# Patient Record
Sex: MALE | Race: WHITE | NOT HISPANIC OR LATINO | Employment: OTHER | URBAN - METROPOLITAN AREA
[De-identification: names, ages, dates, MRNs, and addresses within clinical notes are randomized per-mention and may not be internally consistent; named-entity substitution may affect disease eponyms.]

---

## 2018-01-19 ENCOUNTER — APPOINTMENT (OUTPATIENT)
Dept: PREADMISSION TESTING | Facility: HOSPITAL | Age: 78
End: 2018-01-19

## 2018-01-19 VITALS
DIASTOLIC BLOOD PRESSURE: 93 MMHG | HEART RATE: 94 BPM | RESPIRATION RATE: 20 BRPM | SYSTOLIC BLOOD PRESSURE: 177 MMHG | OXYGEN SATURATION: 97 % | BODY MASS INDEX: 27.58 KG/M2 | HEIGHT: 68 IN | WEIGHT: 182 LBS | TEMPERATURE: 98 F

## 2018-01-19 LAB
ANION GAP SERPL CALCULATED.3IONS-SCNC: 14.5 MMOL/L
BACTERIA UR QL AUTO: ABNORMAL /HPF
BILIRUB UR QL STRIP: NEGATIVE
BUN BLD-MCNC: 12 MG/DL (ref 8–23)
BUN/CREAT SERPL: 9.7 (ref 7–25)
CALCIUM SPEC-SCNC: 10 MG/DL (ref 8.6–10.5)
CHLORIDE SERPL-SCNC: 97 MMOL/L (ref 98–107)
CLARITY UR: ABNORMAL
CO2 SERPL-SCNC: 28.5 MMOL/L (ref 22–29)
COLOR UR: YELLOW
CREAT BLD-MCNC: 1.24 MG/DL (ref 0.76–1.27)
DEPRECATED RDW RBC AUTO: 43.4 FL (ref 37–54)
ERYTHROCYTE [DISTWIDTH] IN BLOOD BY AUTOMATED COUNT: 12.7 % (ref 11.5–14.5)
GFR SERPL CREATININE-BSD FRML MDRD: 57 ML/MIN/1.73
GLUCOSE BLD-MCNC: 103 MG/DL (ref 65–99)
GLUCOSE UR STRIP-MCNC: NEGATIVE MG/DL
HCT VFR BLD AUTO: 48.3 % (ref 40.4–52.2)
HGB BLD-MCNC: 16.2 G/DL (ref 13.7–17.6)
HGB UR QL STRIP.AUTO: ABNORMAL
HYALINE CASTS UR QL AUTO: ABNORMAL /LPF
KETONES UR QL STRIP: NEGATIVE
LEUKOCYTE ESTERASE UR QL STRIP.AUTO: ABNORMAL
MCH RBC QN AUTO: 31.6 PG (ref 27–32.7)
MCHC RBC AUTO-ENTMCNC: 33.5 G/DL (ref 32.6–36.4)
MCV RBC AUTO: 94.2 FL (ref 79.8–96.2)
NITRITE UR QL STRIP: NEGATIVE
PH UR STRIP.AUTO: 7 [PH] (ref 5–8)
PLATELET # BLD AUTO: 220 10*3/MM3 (ref 140–500)
PMV BLD AUTO: 11.5 FL (ref 6–12)
POTASSIUM BLD-SCNC: 4.6 MMOL/L (ref 3.5–5.2)
PROT UR QL STRIP: NEGATIVE
RBC # BLD AUTO: 5.13 10*6/MM3 (ref 4.6–6)
RBC # UR: ABNORMAL /HPF
REF LAB TEST METHOD: ABNORMAL
SODIUM BLD-SCNC: 140 MMOL/L (ref 136–145)
SP GR UR STRIP: 1.01 (ref 1–1.03)
SQUAMOUS #/AREA URNS HPF: ABNORMAL /HPF
UROBILINOGEN UR QL STRIP: ABNORMAL
WBC NRBC COR # BLD: 6.27 10*3/MM3 (ref 4.5–10.7)
WBC UR QL AUTO: ABNORMAL /HPF

## 2018-01-19 PROCEDURE — 93005 ELECTROCARDIOGRAM TRACING: CPT

## 2018-01-19 PROCEDURE — 85027 COMPLETE CBC AUTOMATED: CPT | Performed by: UROLOGY

## 2018-01-19 PROCEDURE — 36415 COLL VENOUS BLD VENIPUNCTURE: CPT

## 2018-01-19 PROCEDURE — 87086 URINE CULTURE/COLONY COUNT: CPT | Performed by: UROLOGY

## 2018-01-19 PROCEDURE — 81001 URINALYSIS AUTO W/SCOPE: CPT | Performed by: UROLOGY

## 2018-01-19 PROCEDURE — 80048 BASIC METABOLIC PNL TOTAL CA: CPT | Performed by: UROLOGY

## 2018-01-19 PROCEDURE — 93010 ELECTROCARDIOGRAM REPORT: CPT | Performed by: INTERNAL MEDICINE

## 2018-01-19 NOTE — DISCHARGE INSTRUCTIONS
Take the following medications the morning of surgery with a small sip of water:  BRING BLOOD PRESSURE MEDICINE WITH YOU MORNING OF SURGERY      General Instructions:  • Do not eat or drink anything after midnight the night before surgery.  • Infants may have breast milk up to four hours before surgery.  • Infants drinking formula may drink formula up to six hours before surgery.   • Patients who avoid smoking, chewing tobacco and alcohol for 4 weeks prior to surgery have a reduced risk of post-operative complications.  Quit smoking as many days before surgery as you can.  • Do not smoke, use chewing tobacco or drink alcohol the day of surgery.   • If applicable bring your C-PAP/ BI-PAP machine.  • Bring any papers given to you in the doctor’s office.  • Wear clean comfortable clothes and socks.  • Do not wear contact lenses or make-up.  Bring a case for your glasses.   • Bring crutches or walker if applicable.  • Remove all piercings.  Leave jewelry and any other valuables at home.  • Hair extensions with metal clips must be removed prior to surgery.  • The Pre-Admission Testing nurse will instruct you to bring medications if unable to obtain an accurate list in Pre-Admission Testing.        If you were given a blood bank ID arm band remember to bring it with you the day of surgery.    Preventing a Surgical Site Infection:  • For 2 to 3 days before surgery, avoid shaving with a razor because the razor can irritate skin and make it easier to develop an infection.  • The night prior to surgery sleep in a clean bed with clean clothing.  Do not allow pets to sleep with you.  • Shower on the morning of surgery using a fresh bar of anti-bacterial soap (such as Dial) and clean washcloth.  Dry with a clean towel and dress in clean clothing.  • Ask your surgeon if you will be receiving antibiotics prior to surgery.  • Make sure you, your family, and all healthcare providers clean their hands with soap and water or an  alcohol based hand  before caring for you or your wound.    Day of surgery:1/26/2018 ARRIVAL TIME 10:00 AM  Upon arrival, a Pre-op nurse and Anesthesiologist will review your health history, obtain vital signs, and answer questions you may have.  The only belongings needed at this time will be your home medications and if applicable your C-PAP/BI-PAP machine.  If you are staying overnight your family can leave the rest of your belongings in the car and bring them to your room later.  A Pre-op nurse will start an IV and you may receive medication in preparation for surgery, including something to help you relax.  Your family will be able to see you in the Pre-op area.  While you are in surgery your family should notify the waiting room  if they leave the waiting room area and provide a contact phone number.    Please be aware that surgery does come with discomfort.  We want to make every effort to control your discomfort so please discuss any uncontrolled symptoms with your nurse.   Your doctor will most likely have prescribed pain medications.      If you are going home after surgery you will receive individualized written care instructions before being discharged.  A responsible adult must drive you to and from the hospital on the day of your surgery and stay with you for 24 hours.    If you are staying overnight following surgery, you will be transported to your hospital room following the recovery period.  [facility] has all private rooms.    If you have any questions please call Pre-Admission Testing at [phone].  Deductibles and co-payments are collected on the day of service. Please be prepared to pay the required co-pay, deductible or deposit on the day of service as defined by your plan.

## 2018-01-21 LAB
BACTERIA SPEC AEROBE CULT: NORMAL
BACTERIA SPEC AEROBE CULT: NORMAL

## 2018-01-26 ENCOUNTER — ANESTHESIA EVENT (OUTPATIENT)
Dept: PERIOP | Facility: HOSPITAL | Age: 78
End: 2018-01-26

## 2018-01-26 ENCOUNTER — ANESTHESIA (OUTPATIENT)
Dept: PERIOP | Facility: HOSPITAL | Age: 78
End: 2018-01-26

## 2018-01-26 ENCOUNTER — HOSPITAL ENCOUNTER (OUTPATIENT)
Facility: HOSPITAL | Age: 78
Setting detail: HOSPITAL OUTPATIENT SURGERY
Discharge: HOME | End: 2018-01-26
Attending: UROLOGY | Admitting: UROLOGY

## 2018-01-26 VITALS
BODY MASS INDEX: 27.17 KG/M2 | SYSTOLIC BLOOD PRESSURE: 148 MMHG | WEIGHT: 179.25 LBS | OXYGEN SATURATION: 97 % | HEART RATE: 81 BPM | TEMPERATURE: 97.8 F | RESPIRATION RATE: 18 BRPM | HEIGHT: 68 IN | DIASTOLIC BLOOD PRESSURE: 84 MMHG

## 2018-01-26 PROCEDURE — 25010000002 PROPOFOL 10 MG/ML EMULSION: Performed by: NURSE ANESTHETIST, CERTIFIED REGISTERED

## 2018-01-26 PROCEDURE — 25010000002 ONDANSETRON PER 1 MG: Performed by: NURSE ANESTHETIST, CERTIFIED REGISTERED

## 2018-01-26 PROCEDURE — 25010000002 MIDAZOLAM PER 1 MG: Performed by: ANESTHESIOLOGY

## 2018-01-26 PROCEDURE — 25010000002 FENTANYL CITRATE (PF) 100 MCG/2ML SOLUTION: Performed by: NURSE ANESTHETIST, CERTIFIED REGISTERED

## 2018-01-26 PROCEDURE — 25010000002 DEXAMETHASONE PER 1 MG: Performed by: NURSE ANESTHETIST, CERTIFIED REGISTERED

## 2018-01-26 PROCEDURE — 25010000003 CEFAZOLIN 1 GM/50ML SOLUTION: Performed by: UROLOGY

## 2018-01-26 PROCEDURE — C1769 GUIDE WIRE: HCPCS | Performed by: UROLOGY

## 2018-01-26 RX ORDER — HYDROMORPHONE HCL 110MG/55ML
0.5 PATIENT CONTROLLED ANALGESIA SYRINGE INTRAVENOUS
Status: DISCONTINUED | OUTPATIENT
Start: 2018-01-26 | End: 2018-01-26 | Stop reason: HOSPADM

## 2018-01-26 RX ORDER — NALOXONE HCL 0.4 MG/ML
0.2 VIAL (ML) INJECTION AS NEEDED
Status: DISCONTINUED | OUTPATIENT
Start: 2018-01-26 | End: 2018-01-26 | Stop reason: HOSPADM

## 2018-01-26 RX ORDER — HYDRALAZINE HYDROCHLORIDE 20 MG/ML
5 INJECTION INTRAMUSCULAR; INTRAVENOUS
Status: DISCONTINUED | OUTPATIENT
Start: 2018-01-26 | End: 2018-01-26 | Stop reason: HOSPADM

## 2018-01-26 RX ORDER — MIDAZOLAM HYDROCHLORIDE 1 MG/ML
1 INJECTION INTRAMUSCULAR; INTRAVENOUS
Status: DISCONTINUED | OUTPATIENT
Start: 2018-01-26 | End: 2018-01-26 | Stop reason: HOSPADM

## 2018-01-26 RX ORDER — PROMETHAZINE HYDROCHLORIDE 25 MG/1
25 TABLET ORAL ONCE AS NEEDED
Status: DISCONTINUED | OUTPATIENT
Start: 2018-01-26 | End: 2018-01-26 | Stop reason: HOSPADM

## 2018-01-26 RX ORDER — FENTANYL CITRATE 50 UG/ML
INJECTION, SOLUTION INTRAMUSCULAR; INTRAVENOUS AS NEEDED
Status: DISCONTINUED | OUTPATIENT
Start: 2018-01-26 | End: 2018-01-26 | Stop reason: SURG

## 2018-01-26 RX ORDER — FENTANYL CITRATE 50 UG/ML
50 INJECTION, SOLUTION INTRAMUSCULAR; INTRAVENOUS
Status: DISCONTINUED | OUTPATIENT
Start: 2018-01-26 | End: 2018-01-26 | Stop reason: HOSPADM

## 2018-01-26 RX ORDER — LIDOCAINE HYDROCHLORIDE 20 MG/ML
INJECTION, SOLUTION INFILTRATION; PERINEURAL AS NEEDED
Status: DISCONTINUED | OUTPATIENT
Start: 2018-01-26 | End: 2018-01-26 | Stop reason: SURG

## 2018-01-26 RX ORDER — FAMOTIDINE 10 MG/ML
20 INJECTION, SOLUTION INTRAVENOUS ONCE
Status: COMPLETED | OUTPATIENT
Start: 2018-01-26 | End: 2018-01-26

## 2018-01-26 RX ORDER — ONDANSETRON 2 MG/ML
INJECTION INTRAMUSCULAR; INTRAVENOUS AS NEEDED
Status: DISCONTINUED | OUTPATIENT
Start: 2018-01-26 | End: 2018-01-26 | Stop reason: SURG

## 2018-01-26 RX ORDER — PROMETHAZINE HYDROCHLORIDE 25 MG/ML
5 INJECTION, SOLUTION INTRAMUSCULAR; INTRAVENOUS
Status: DISCONTINUED | OUTPATIENT
Start: 2018-01-26 | End: 2018-01-26 | Stop reason: HOSPADM

## 2018-01-26 RX ORDER — MIDAZOLAM HYDROCHLORIDE 1 MG/ML
2 INJECTION INTRAMUSCULAR; INTRAVENOUS
Status: DISCONTINUED | OUTPATIENT
Start: 2018-01-26 | End: 2018-01-26 | Stop reason: HOSPADM

## 2018-01-26 RX ORDER — DIPHENHYDRAMINE HYDROCHLORIDE 50 MG/ML
12.5 INJECTION INTRAMUSCULAR; INTRAVENOUS
Status: DISCONTINUED | OUTPATIENT
Start: 2018-01-26 | End: 2018-01-26 | Stop reason: HOSPADM

## 2018-01-26 RX ORDER — LIDOCAINE HYDROCHLORIDE 10 MG/ML
0.5 INJECTION, SOLUTION EPIDURAL; INFILTRATION; INTRACAUDAL; PERINEURAL ONCE AS NEEDED
Status: DISCONTINUED | OUTPATIENT
Start: 2018-01-26 | End: 2018-01-26 | Stop reason: HOSPADM

## 2018-01-26 RX ORDER — AMLODIPINE BESYLATE 5 MG/1
5 TABLET ORAL DAILY
COMMUNITY

## 2018-01-26 RX ORDER — LABETALOL HYDROCHLORIDE 5 MG/ML
5 INJECTION, SOLUTION INTRAVENOUS
Status: DISCONTINUED | OUTPATIENT
Start: 2018-01-26 | End: 2018-01-26 | Stop reason: HOSPADM

## 2018-01-26 RX ORDER — FLUMAZENIL 0.1 MG/ML
0.2 INJECTION INTRAVENOUS AS NEEDED
Status: DISCONTINUED | OUTPATIENT
Start: 2018-01-26 | End: 2018-01-26 | Stop reason: HOSPADM

## 2018-01-26 RX ORDER — PROMETHAZINE HYDROCHLORIDE 25 MG/1
25 SUPPOSITORY RECTAL ONCE AS NEEDED
Status: DISCONTINUED | OUTPATIENT
Start: 2018-01-26 | End: 2018-01-26 | Stop reason: HOSPADM

## 2018-01-26 RX ORDER — PROMETHAZINE HYDROCHLORIDE 25 MG/ML
12.5 INJECTION, SOLUTION INTRAMUSCULAR; INTRAVENOUS ONCE AS NEEDED
Status: DISCONTINUED | OUTPATIENT
Start: 2018-01-26 | End: 2018-01-26 | Stop reason: HOSPADM

## 2018-01-26 RX ORDER — PROMETHAZINE HYDROCHLORIDE 25 MG/1
12.5 TABLET ORAL ONCE AS NEEDED
Status: DISCONTINUED | OUTPATIENT
Start: 2018-01-26 | End: 2018-01-26 | Stop reason: HOSPADM

## 2018-01-26 RX ORDER — PROPOFOL 10 MG/ML
VIAL (ML) INTRAVENOUS AS NEEDED
Status: DISCONTINUED | OUTPATIENT
Start: 2018-01-26 | End: 2018-01-26 | Stop reason: SURG

## 2018-01-26 RX ORDER — ONDANSETRON 2 MG/ML
4 INJECTION INTRAMUSCULAR; INTRAVENOUS ONCE AS NEEDED
Status: DISCONTINUED | OUTPATIENT
Start: 2018-01-26 | End: 2018-01-26 | Stop reason: HOSPADM

## 2018-01-26 RX ORDER — DEXAMETHASONE SODIUM PHOSPHATE 10 MG/ML
INJECTION INTRAMUSCULAR; INTRAVENOUS AS NEEDED
Status: DISCONTINUED | OUTPATIENT
Start: 2018-01-26 | End: 2018-01-26 | Stop reason: SURG

## 2018-01-26 RX ORDER — SODIUM CHLORIDE, SODIUM LACTATE, POTASSIUM CHLORIDE, CALCIUM CHLORIDE 600; 310; 30; 20 MG/100ML; MG/100ML; MG/100ML; MG/100ML
9 INJECTION, SOLUTION INTRAVENOUS CONTINUOUS
Status: DISCONTINUED | OUTPATIENT
Start: 2018-01-26 | End: 2018-01-26 | Stop reason: HOSPADM

## 2018-01-26 RX ORDER — EPHEDRINE SULFATE 50 MG/ML
5 INJECTION, SOLUTION INTRAVENOUS ONCE AS NEEDED
Status: DISCONTINUED | OUTPATIENT
Start: 2018-01-26 | End: 2018-01-26 | Stop reason: HOSPADM

## 2018-01-26 RX ORDER — SODIUM CHLORIDE 0.9 % (FLUSH) 0.9 %
1-10 SYRINGE (ML) INJECTION AS NEEDED
Status: DISCONTINUED | OUTPATIENT
Start: 2018-01-26 | End: 2018-01-26 | Stop reason: HOSPADM

## 2018-01-26 RX ORDER — HYDROCODONE BITARTRATE AND ACETAMINOPHEN 7.5; 325 MG/1; MG/1
1 TABLET ORAL ONCE AS NEEDED
Status: DISCONTINUED | OUTPATIENT
Start: 2018-01-26 | End: 2018-01-26 | Stop reason: HOSPADM

## 2018-01-26 RX ORDER — OXYCODONE AND ACETAMINOPHEN 7.5; 325 MG/1; MG/1
1 TABLET ORAL ONCE AS NEEDED
Status: DISCONTINUED | OUTPATIENT
Start: 2018-01-26 | End: 2018-01-26 | Stop reason: HOSPADM

## 2018-01-26 RX ADMIN — ONDANSETRON 4 MG: 2 INJECTION INTRAMUSCULAR; INTRAVENOUS at 13:37

## 2018-01-26 RX ADMIN — LIDOCAINE HYDROCHLORIDE 100 MG: 20 INJECTION, SOLUTION INFILTRATION; PERINEURAL at 13:18

## 2018-01-26 RX ADMIN — SODIUM CHLORIDE, POTASSIUM CHLORIDE, SODIUM LACTATE AND CALCIUM CHLORIDE 9 ML/HR: 600; 310; 30; 20 INJECTION, SOLUTION INTRAVENOUS at 13:03

## 2018-01-26 RX ADMIN — PROPOFOL 150 MG: 10 INJECTION, EMULSION INTRAVENOUS at 13:18

## 2018-01-26 RX ADMIN — MIDAZOLAM 1 MG: 1 INJECTION INTRAMUSCULAR; INTRAVENOUS at 13:07

## 2018-01-26 RX ADMIN — CEFAZOLIN SODIUM 1 G: 1 INJECTION, SOLUTION INTRAVENOUS at 13:16

## 2018-01-26 RX ADMIN — DEXAMETHASONE SODIUM PHOSPHATE 8 MG: 10 INJECTION INTRAMUSCULAR; INTRAVENOUS at 13:36

## 2018-01-26 RX ADMIN — FENTANYL CITRATE 25 MCG: 50 INJECTION INTRAMUSCULAR; INTRAVENOUS at 13:30

## 2018-01-26 RX ADMIN — FAMOTIDINE 20 MG: 10 INJECTION, SOLUTION INTRAVENOUS at 13:06

## 2018-01-26 RX ADMIN — FENTANYL CITRATE 50 MCG: 50 INJECTION INTRAMUSCULAR; INTRAVENOUS at 13:18

## 2018-01-26 NOTE — PLAN OF CARE
Problem: Patient Care Overview (Adult)  Goal: Plan of Care Review  Outcome: Outcome(s) achieved Date Met: 01/26/18 01/26/18 7520   Coping/Psychosocial Response Interventions   Plan Of Care Reviewed With patient;family   Patient Care Overview   Progress improving   Outcome Evaluation   Outcome Summary/Follow up Plan ready for discharge     Goal: Adult Individualization and Mutuality  Outcome: Outcome(s) achieved Date Met: 01/26/18    Goal: Discharge Needs Assessment  Outcome: Outcome(s) achieved Date Met: 01/26/18      Problem: Perioperative Period (Adult)  Goal: Signs and Symptoms of Listed Potential Problems Will be Absent or Manageable (Perioperative Period)  Outcome: Outcome(s) achieved Date Met: 01/26/18

## 2018-01-26 NOTE — ANESTHESIA POSTPROCEDURE EVALUATION
"Patient: William Dorsey    Procedure Summary     Date Anesthesia Start Anesthesia Stop Room / Location    01/26/18 3067 [id number]  [facility] / [facility]       Procedure Diagnosis Surgeon Provider    CYSTOSCOPY WITH INTERNAL VISUAL URETHROTOMY  (N/A ) No diagnosis on file. MD Trena Bermudez MD          Anesthesia Type: general  Last vitals  BP   148/84 (01/26/18 1515)   Temp   36.6 °C (97.8 °F) (01/26/18 1427)   Pulse   81 (01/26/18 1515)   Resp   18 (01/26/18 1515)     SpO2   97 % (01/26/18 1515)     Post Anesthesia Care and Evaluation    Patient location during evaluation: bedside  Patient participation: complete - patient participated  Level of consciousness: awake  Pain score: 2  Pain management: adequate  Airway patency: patent  Anesthetic complications: No anesthetic complications    Cardiovascular status: acceptable  Respiratory status: acceptable  Hydration status: acceptable    Comments: /84  Pulse 81  Temp 36.6 °C (97.8 °F) (Oral)   Resp 18  Ht 172.7 cm (68\")  Wt 81.3 kg (179 lb 4 oz)  SpO2 97%  BMI 27.25 kg/m2        "

## 2018-01-26 NOTE — ANESTHESIA PREPROCEDURE EVALUATION
Anesthesia Evaluation     Patient summary reviewed and Nursing notes reviewed   NPO Solid Status: > 8 hours  NPO Liquid Status: > 2 hours     Airway   Mallampati: II  no difficulty expected  Dental - normal exam     Comment: Multiple fillings    Pulmonary     breath sounds clear to auscultation  (+) a smoker Former,   Cardiovascular     ECG reviewed  Rhythm: regular  Rate: normal    (+) hypertension,       Neuro/Psych  GI/Hepatic/Renal/Endo      Musculoskeletal     Abdominal    Substance History      OB/GYN          Other                                                Anesthesia Plan    ASA 3     general     intravenous induction   Anesthetic plan and risks discussed with patient.

## 2018-01-26 NOTE — BRIEF OP NOTE
CYSTOSCOPY URETHROTOMY VISUAL INTERNAL  Progress Note    William Dorsey  1/26/2018    Pre-op Diagnosis:   bxo  hypospadius   Urethral stricture dz         Post-Op Diagnosis Codes:   same  Same pan stricture dz urethral     Procedure/CPT® Codes:      Procedure(s):  CYSTOSCOPY WITH INTERNAL VISUAL URETHROTOMY /urethral dilation      Surgeon(s):  Marcos Bowden MD    Anesthesia: General    Staff:   Circulator: Cari Betancur RN  Scrub Person: Lor Jane    Estimated Blood Loss: none    Urine Voided: * No values recorded between 1/26/2018  1:12 PM and 1/26/2018  1:41 PM *    Specimens:                None      Drains:   Urethral Catheter 01/26/18 1332 100% silicone 16 10 10 (Active)           Findings:subcoronal hypospadius  With meatal stenosis /bxo dilated  Pan urethral stricture dz      Complications:       Marcos Bowden MD     Date: 1/26/2018  Time: 1:46 PM

## 2018-01-26 NOTE — PERIOPERATIVE NURSING NOTE
Educated patient and grandwillghter on crowe catheter care and to remove catheter in 5 days.  Verbalized and demonstrated understanding.

## 2018-01-26 NOTE — PLAN OF CARE
Problem: Patient Care Overview (Adult)  Goal: Plan of Care Review  Outcome: Outcome(s) achieved Date Met: 01/26/18 01/26/18 1519   Coping/Psychosocial Response Interventions   Plan Of Care Reviewed With patient;family   Patient Care Overview   Progress improving   Outcome Evaluation   Outcome Summary/Follow up Plan ready for discharge     Goal: Adult Individualization and Mutuality  Outcome: Outcome(s) achieved Date Met: 01/26/18    Goal: Discharge Needs Assessment  Outcome: Outcome(s) achieved Date Met: 01/26/18      Problem: Perioperative Period (Adult)  Goal: Signs and Symptoms of Listed Potential Problems Will be Absent or Manageable (Perioperative Period)  Outcome: Outcome(s) achieved Date Met: 01/26/18 01/26/18 1519   Perioperative Period   Problems Assessed (Perioperative Period) all   Problems Present (Perioperative Period) none

## 2018-01-26 NOTE — ANESTHESIA PROCEDURE NOTES
Airway  Urgency: elective    Airway not difficult    General Information and Staff    Patient location during procedure: OR  Anesthesiologist: CRIS POZO  CRNA: DIANE THOMAS    Indications and Patient Condition  Indications for airway management: airway protection    Preoxygenated: yes  MILS maintained throughout  Mask difficulty assessment: 1 - vent by mask    Final Airway Details  Final airway type: supraglottic airway      Successful airway: ProSeal  Size 5    Number of attempts at approach: 1    Additional Comments  Smooth iv induction with no complications

## 2018-01-26 NOTE — OP NOTE
Preoperative diagnosis subcoronal hypospadias BXO meatal stenosis diffuse urethral stricture disease    Postoperative diagnosis same pain and urethral stricture disease open prostate channel severe bladder trabeculations small bladder diverticuli    Operative procedure cystoscopy urethral dilation visual  internal urethrotomy Huynh catheter placement    Surgeon Kal    Anesthesia general    Procedure note 77-year-old gentleman have not seen for quite some time revisiting himself with severe outflow obstruction with examination showing extensive BX of of a subcoronal hypospadiac meatus with narrowing to undergo the above-mentioned procedure preoperative urine culture was mixed    Timeout was taken antibodies were given after adequate general anesthesia is placed very carefully modified dorsolithotomy position all pressure points relieved rectal examination under anesthesia revealed the rectal masses prostate was only about 25 g smooth and benign is a prepped draped sterile fashion genitalia 2% intraurethral Xylocaine jelly administered to a subcoronal hypospadias with inflamed BX own scarring gradual urethral dilation was done approximately 26 [place] without difficulty visualization with the pan endoscope revealed diffuse urethral stricture disease unable to navigate past easily and there required incision in the mid urethral stricture and proximal bulb stricture egress of the scope down to the open prostatic channel high riding bladder neck compatible with probably a couple obstruction and severe bladder trabeculations cellules and a few diverticula he no bladder tumors were seen ureteral orifices were patent with clear reflux normal in position and configuration scope was removed no active bleeding was seen Huynh catheter was inserted and placed a urinary leg procedure well findings discussed with family    Disposition anticipation be discharged with Huynh catheter be instructed to remove the floor leaving a day  and about 5 days and continue on his home medications with additional medications by me of Bactrim double strength twice a day for 5 days  Bactrim ds pyridium Norco Phenergan and clobetasol 0.05% applied twice a day for 2 weeks follow-up in my office in approximately 7-14 days

## 2018-12-18 ENCOUNTER — APPOINTMENT (OUTPATIENT)
Dept: GENERAL RADIOLOGY | Facility: HOSPITAL | Age: 78
End: 2018-12-18

## 2018-12-18 ENCOUNTER — HOSPITAL ENCOUNTER (EMERGENCY)
Facility: HOSPITAL | Age: 78
Discharge: HOME | End: 2018-12-18
Attending: EMERGENCY MEDICINE | Admitting: EMERGENCY MEDICINE

## 2018-12-18 VITALS
WEIGHT: 183.9 LBS | TEMPERATURE: 98.2 F | DIASTOLIC BLOOD PRESSURE: 78 MMHG | HEART RATE: 72 BPM | BODY MASS INDEX: 27.87 KG/M2 | RESPIRATION RATE: 20 BRPM | SYSTOLIC BLOOD PRESSURE: 134 MMHG | OXYGEN SATURATION: 96 % | HEIGHT: 68 IN

## 2018-12-18 DIAGNOSIS — R07.9 CHEST PAIN, UNSPECIFIED TYPE: Primary | ICD-10-CM

## 2018-12-18 LAB
ALBUMIN SERPL-MCNC: 4.3 G/DL (ref 3.5–5.2)
ALBUMIN/GLOB SERPL: 1 G/DL
ALP SERPL-CCNC: 88 U/L (ref 39–117)
ALT SERPL W P-5'-P-CCNC: 23 U/L (ref 1–41)
ANION GAP SERPL CALCULATED.3IONS-SCNC: 12 MMOL/L
AST SERPL-CCNC: 27 U/L (ref 1–40)
BASOPHILS # BLD AUTO: 0.06 10*3/MM3 (ref 0–0.2)
BASOPHILS NFR BLD AUTO: 1.1 % (ref 0–1.5)
BILIRUB SERPL-MCNC: 0.6 MG/DL (ref 0.1–1.2)
BUN BLD-MCNC: 11 MG/DL (ref 8–23)
BUN/CREAT SERPL: 8.6 (ref 7–25)
CALCIUM SPEC-SCNC: 9.9 MG/DL (ref 8.6–10.5)
CHLORIDE SERPL-SCNC: 104 MMOL/L (ref 98–107)
CO2 SERPL-SCNC: 26 MMOL/L (ref 22–29)
CREAT BLD-MCNC: 1.28 MG/DL (ref 0.76–1.27)
DEPRECATED RDW RBC AUTO: 43.9 FL (ref 37–54)
EOSINOPHIL # BLD AUTO: 0.07 10*3/MM3 (ref 0–0.7)
EOSINOPHIL NFR BLD AUTO: 1.3 % (ref 0.3–6.2)
ERYTHROCYTE [DISTWIDTH] IN BLOOD BY AUTOMATED COUNT: 12.8 % (ref 11.5–14.5)
GFR SERPL CREATININE-BSD FRML MDRD: 54 ML/MIN/1.73
GLOBULIN UR ELPH-MCNC: 4.1 GM/DL
GLUCOSE BLD-MCNC: 117 MG/DL (ref 65–99)
HCT VFR BLD AUTO: 47.9 % (ref 40.4–52.2)
HGB BLD-MCNC: 15.7 G/DL (ref 13.7–17.6)
IMM GRANULOCYTES # BLD: 0 10*3/MM3 (ref 0–0.03)
IMM GRANULOCYTES NFR BLD: 0 % (ref 0–0.5)
LYMPHOCYTES # BLD AUTO: 1.42 10*3/MM3 (ref 0.9–4.8)
LYMPHOCYTES NFR BLD AUTO: 26 % (ref 19.6–45.3)
MCH RBC QN AUTO: 31.2 PG (ref 27–32.7)
MCHC RBC AUTO-ENTMCNC: 32.8 G/DL (ref 32.6–36.4)
MCV RBC AUTO: 95 FL (ref 79.8–96.2)
MONOCYTES # BLD AUTO: 0.52 10*3/MM3 (ref 0.2–1.2)
MONOCYTES NFR BLD AUTO: 9.5 % (ref 5–12)
NEUTROPHILS # BLD AUTO: 3.39 10*3/MM3 (ref 1.9–8.1)
NEUTROPHILS NFR BLD AUTO: 62.1 % (ref 42.7–76)
PLATELET # BLD AUTO: 207 10*3/MM3 (ref 140–500)
PMV BLD AUTO: 11 FL (ref 6–12)
POTASSIUM BLD-SCNC: 4.2 MMOL/L (ref 3.5–5.2)
PROT SERPL-MCNC: 8.4 G/DL (ref 6–8.5)
RBC # BLD AUTO: 5.04 10*6/MM3 (ref 4.6–6)
SODIUM BLD-SCNC: 142 MMOL/L (ref 136–145)
TROPONIN T SERPL-MCNC: <0.01 NG/ML (ref 0–0.03)
WBC NRBC COR # BLD: 5.46 10*3/MM3 (ref 4.5–10.7)

## 2018-12-18 PROCEDURE — 93010 ELECTROCARDIOGRAM REPORT: CPT | Performed by: INTERNAL MEDICINE

## 2018-12-18 PROCEDURE — 84484 ASSAY OF TROPONIN QUANT: CPT | Performed by: EMERGENCY MEDICINE

## 2018-12-18 PROCEDURE — 80053 COMPREHEN METABOLIC PANEL: CPT | Performed by: EMERGENCY MEDICINE

## 2018-12-18 PROCEDURE — 93005 ELECTROCARDIOGRAM TRACING: CPT

## 2018-12-18 PROCEDURE — 71046 X-RAY EXAM CHEST 2 VIEWS: CPT

## 2018-12-18 PROCEDURE — 93005 ELECTROCARDIOGRAM TRACING: CPT | Performed by: EMERGENCY MEDICINE

## 2018-12-18 PROCEDURE — 99283 EMERGENCY DEPT VISIT LOW MDM: CPT

## 2018-12-18 PROCEDURE — 85025 COMPLETE CBC W/AUTO DIFF WBC: CPT | Performed by: EMERGENCY MEDICINE

## 2018-12-18 RX ORDER — SODIUM CHLORIDE 0.9 % (FLUSH) 0.9 %
10 SYRINGE (ML) INJECTION AS NEEDED
Status: DISCONTINUED | OUTPATIENT
Start: 2018-12-18 | End: 2018-12-18 | Stop reason: HOSPADM

## 2018-12-18 NOTE — ED TRIAGE NOTES
Pt c/o right sided neck pain and chest pain onset yesterday, pt was seen at [facility] and sent to ED for further eval

## 2018-12-18 NOTE — ED PROVIDER NOTES
EMERGENCY DEPARTMENT ENCOUNTER    CHIEF COMPLAINT  Chief Complaint: cp  History given by: patient  History limited by: none  Room Number: 15/15  PMD: Nerissa Breen APRN      HPI:  Pt is a 78 y.o. male who presents complaining of moderate intermittent right sided cp that began yesterday morning. Pt states each episode last 10 seconds and that the pain radiates into his right shoulder. Pt states it has lessened in frequency since yesterday morning. Pt denies fever, HA, sore throat, cough, SOA, abd pain, N/V/D, dysuria, and leg swelling. Pt states he had a stent placed 8-10 years ago and is not followed by a cardiologist. Pt states he was seen at [facility] prior and referred to ED for further evaluation.          Duration/Onset/Timing: intermittent for one day  Location: right side of chest  Radiation: right shoulder  Quality: pain  Intensity/Severity: moderate  Associated Symptoms: right shoulder pain  Aggravating or Alleviating Factors: none  Previous Episodes: Pt has hx of cardiac hx and has had a stent placed.       PAST MEDICAL HISTORY  Active Ambulatory Problems     Diagnosis Date Noted   • No Active Ambulatory Problems     Resolved Ambulatory Problems     Diagnosis Date Noted   • No Resolved Ambulatory Problems     Past Medical History:   Diagnosis Date   • Cough    • History of UTI    • Hypertension    • Urethral stricture        PAST SURGICAL HISTORY  Past Surgical History:   Procedure Laterality Date   • COLONOSCOPY     • CYSTOSCOPY URETEROSCOPY     • ENDOSCOPY         FAMILY HISTORY  Family History   Problem Relation Age of Onset   • Malig Hyperthermia Neg Hx        SOCIAL HISTORY  Social History     Socioeconomic History   • Marital status: Single     Spouse name: Not on file   • Number of children: Not on file   • Years of education: Not on file   • Highest education level: Not on file   Social Needs   • Financial resource strain: Not on file   • Food insecurity - worry: Not on file   • Food insecurity -  inability: Not on file   • Transportation needs - medical: Not on file   • Transportation needs - non-medical: Not on file   Occupational History   • Not on file   Tobacco Use   • Smoking status: Former Smoker     Years: 30.00     Types: Cigarettes   • Smokeless tobacco: Never Used   • Tobacco comment: QUIT 1980   Substance and Sexual Activity   • Alcohol use: No     Comment: HISTORY OF ALCOHOLISM/NONE SINCE 1976   • Drug use: No   • Sexual activity: Defer   Other Topics Concern   • Not on file   Social History Narrative   • Not on file       ALLERGIES  Patient has no known allergies.    REVIEW OF SYSTEMS  Review of Systems   Constitutional: Negative for activity change, appetite change and fever.   HENT: Negative for congestion and sore throat.    Eyes: Negative.    Respiratory: Negative for cough and shortness of breath.    Cardiovascular: Positive for chest pain (right sided). Negative for leg swelling.   Gastrointestinal: Negative for abdominal pain, diarrhea and vomiting.   Endocrine: Negative.    Genitourinary: Negative for decreased urine volume and dysuria.   Musculoskeletal: Positive for arthralgias (right shoulder). Negative for neck pain.   Skin: Negative for rash and wound.   Allergic/Immunologic: Negative.    Neurological: Negative for weakness, numbness and headaches.   Hematological: Negative.    Psychiatric/Behavioral: Negative.    All other systems reviewed and are negative.      PHYSICAL EXAM  ED Triage Vitals   Temp Heart Rate Resp BP SpO2   12/18/18 1400 12/18/18 1352 12/18/18 1400 12/18/18 1400 12/18/18 1352   98.2 °F (36.8 °C) 71 18 144/89 95 %      Temp src Heart Rate Source Patient Position BP Location FiO2 (%)   -- 12/18/18 1352 -- -- --    Monitor          Physical Exam   Constitutional: He is oriented to person, place, and time. No distress.   HENT:   Head: Normocephalic and atraumatic.   Eyes: EOM are normal. Pupils are equal, round, and reactive to light.   Neck: Normal range of motion.  Neck supple.   Cardiovascular: Normal rate, regular rhythm and normal heart sounds.   Pulmonary/Chest: Effort normal and breath sounds normal. No respiratory distress.   Abdominal: Soft. There is no tenderness. There is no rebound and no guarding.   Musculoskeletal: Normal range of motion. He exhibits no edema.   Neurological: He is alert and oriented to person, place, and time. He has normal sensation and normal strength.   Skin: Skin is warm and dry.   Psychiatric: Mood and affect normal.   Nursing note and vitals reviewed.      LAB RESULTS  Lab Results (last 24 hours)     Procedure Component Value Units Date/Time    CBC & Differential [[id number]] Collected:  12/18/18 1511    Specimen:  Blood Updated:  12/18/18 1527    Narrative:       The following orders were created for panel order CBC & Differential.  Procedure                               Abnormality         Status                     ---------                               -----------         ------                     CBC Auto Differential[[id number]]        Normal              Final result                 Please view results for these tests on the individual orders.    Comprehensive Metabolic Panel [[id number]]  (Abnormal) Collected:  12/18/18 1511    Specimen:  Blood Updated:  12/18/18 1550     Glucose 117 mg/dL      BUN 11 mg/dL      Creatinine 1.28 mg/dL      Sodium 142 mmol/L      Potassium 4.2 mmol/L      Chloride 104 mmol/L      CO2 26.0 mmol/L      Calcium 9.9 mg/dL      Total Protein 8.4 g/dL      Albumin 4.30 g/dL      ALT (SGPT) 23 U/L      AST (SGOT) 27 U/L      Alkaline Phosphatase 88 U/L      Total Bilirubin 0.6 mg/dL      eGFR Non African Amer 54 mL/min/1.73      Globulin 4.1 gm/dL      A/G Ratio 1.0 g/dL      BUN/Creatinine Ratio 8.6     Anion Gap 12.0 mmol/L     Narrative:       The MDRD GFR formula is only valid for adults with stable renal function between ages 18 and 70.    Troponin [[id number]]  (Normal) Collected:  12/18/18 1511     Specimen:  Blood Updated:  12/18/18 1550     Troponin T <0.010 ng/mL     Narrative:       Troponin T Reference Ranges:  Less than 0.03 ng/mL:    Negative for AMI  0.03 to 0.09 ng/mL:      Indeterminant for AMI  Greater than 0.09 ng/mL: Positive for AMI    CBC Auto Differential [[id number]]  (Normal) Collected:  12/18/18 1511    Specimen:  Blood Updated:  12/18/18 1527     WBC 5.46 10*3/mm3      RBC 5.04 10*6/mm3      Hemoglobin 15.7 g/dL      Hematocrit 47.9 %      MCV 95.0 fL      MCH 31.2 pg      MCHC 32.8 g/dL      RDW 12.8 %      RDW-SD 43.9 fl      MPV 11.0 fL      Platelets 207 10*3/mm3      Neutrophil % 62.1 %      Lymphocyte % 26.0 %      Monocyte % 9.5 %      Eosinophil % 1.3 %      Basophil % 1.1 %      Immature Grans % 0.0 %      Neutrophils, Absolute 3.39 10*3/mm3      Lymphocytes, Absolute 1.42 10*3/mm3      Monocytes, Absolute 0.52 10*3/mm3      Eosinophils, Absolute 0.07 10*3/mm3      Basophils, Absolute 0.06 10*3/mm3      Immature Grans, Absolute 0.00 10*3/mm3           I ordered the above labs and reviewed the results    RADIOLOGY  XR Chest 2 View   Final Result   Linear likely atelectasis or scar in the left lower lung.   COPD change. Follow-up as clinical indications persist.       This report was finalized on 12/18/2018 3:10 PM by Dr. Clifford Siu M.D.               I ordered the above noted radiological studies. Interpreted by radiologist. Reviewed by me in PACS.       PROCEDURES  Procedures    EKG          EKG time: 1356  Rhythm/Rate: NSR 72 BPM  P waves and WA: normal  QRS, axis: normal axis, IDCD   ST and T waves: unremarkable     Interpreted Contemporaneously by me, independently viewed  Unchanged compared to prior 1/2018    HEART SCORE:    History  Highly suspicious              2    Moderately suspicious             1    Slightly or non-suspicious             0    ECG  Significant ST depression              2    Nonspecific repol disturbance            1    Normal                            0    Age  > or = 65                          2     46-65                           1    < or = 45                          0    Risk factors (hypercholesterolemia, HTN, DM, smoking, pos fam hx, obesity)                            > or = to 3 RF for atherosclerotic dx   2    1 or 2                 1    No risk factors                0    Troponin > or = 3x normal limit               2    1-3x normal limit    1    < or = Normal limit    0        HEART Score Key:  Scores 0-3: 0.9-1.7% risk of adverse cardiac event. In the HEART Score study, these patients were discharged (0.99% in the retrospective study, 1.7% in the prospective study)  Scores 4-6: 12-16.6% risk of adverse cardiac event. In the HEART Score study, these patients were admitted to the hospital. (11.6% retrospective, 16.6% prospective)  Scores ?7: 50-65% risk of adverse cardiac event. In the HEART Score study, these patients were candidates for early invasive measures. (65.2% retrospective, 50.1% prospective)      This patient's HEART score is 3.       PROGRESS AND CONSULTS       1555-Rechecked pt. Pt is resting comfortably with family at bedside. Notified pt and family of troponin <0.010, unremarkable EKG, CXR is negative acute, and heart score of 3. Discussed the plan to discharge pt with plan to f/u with [facility]. Pt and family  understands and agrees with the plan, all questions answered.      MEDICAL DECISION MAKING  Results were reviewed/discussed with the patient and they were also made aware of online access. Pt also made aware that some labs, such as cultures, will not be resulted during ER visit and follow up with PMD is necessary.     MDM  Number of Diagnoses or Management Options  Chest pain, unspecified type:      Amount and/or Complexity of Data Reviewed  Clinical lab tests: reviewed (Troponin <0.010)  Tests in the radiology section of CPT®: reviewed (CXR-Linear likely atelectasis or scar in the left lower lung, negative acute)  Tests in  the medicine section of CPT®: reviewed (See EKG procedure note)  Decide to obtain previous medical records or to obtain history from someone other than the patient: yes  Independent visualization of images, tracings, or specimens: yes           DIAGNOSIS  Final diagnoses:   Chest pain, unspecified type       DISPOSITION  DISCHARGE    Patient discharged in stable condition.    Reviewed implications of results, diagnosis, meds, responsibility to follow up, warning signs and symptoms of possible worsening, potential complications and reasons to return to ER.    Patient/Family voiced understanding of above instructions.    Discussed plan for discharge, as there is no emergent indication for admission. Patient referred to primary care provider for BP management due to today's BP. Pt/family is agreeable and understands need for follow up and repeat testing.  Pt is aware that discharge does not mean that nothing is wrong but it indicates no emergency is present that requires admission and they must continue care with follow-up as given below or physician of their choice.     FOLLOW-UP  [facility]  [address]  [facility] [address] [phone]    Call for Appointment         Medication List      No changes were made to your prescriptions during this visit.           Latest Documented Vital Signs:  As of 3:59 PM  BP- 133/95 HR- 75 Temp- 98.2 °F (36.8 °C) O2 sat- 99%    --  Documentation assistance provided by kolby Grubbs for .  Information recorded by the scribe was done at my direction and has been verified and validated by me.          Whit Grubbs  12/18/18 1446       Blue Rader MD  12/18/18 3604

## 2019-11-26 PROCEDURE — 88307 TISSUE EXAM BY PATHOLOGIST: CPT | Performed by: UROLOGY

## 2019-11-26 PROCEDURE — 88342 IMHCHEM/IMCYTCHM 1ST ANTB: CPT | Performed by: UROLOGY

## 2019-11-26 PROCEDURE — 88305 TISSUE EXAM BY PATHOLOGIST: CPT | Performed by: UROLOGY

## 2019-11-27 ENCOUNTER — LAB REQUISITION (OUTPATIENT)
Dept: LAB | Facility: HOSPITAL | Age: 79
End: 2019-11-27

## 2019-11-27 DIAGNOSIS — C60.2 MALIGNANT NEOPLASM OF BODY OF PENIS (HCC): ICD-10-CM

## 2019-11-29 ENCOUNTER — LAB REQUISITION (OUTPATIENT)
Dept: LAB | Facility: HOSPITAL | Age: 79
End: 2019-11-29

## 2019-11-29 DIAGNOSIS — C60.9 MALIGNANT NEOPLASM OF PENIS, UNSPECIFIED (HCC): ICD-10-CM

## 2019-11-29 PROCEDURE — 88342 IMHCHEM/IMCYTCHM 1ST ANTB: CPT

## 2019-12-02 PROCEDURE — 88342 IMHCHEM/IMCYTCHM 1ST ANTB: CPT

## 2019-12-03 LAB
LAB AP CASE REPORT: NORMAL
LAB AP DIAGNOSIS COMMENT: NORMAL
LAB AP SYNOPTIC CHECKLIST: NORMAL
PATH REPORT.ADDENDUM SPEC: NORMAL
PATH REPORT.FINAL DX SPEC: NORMAL
PATH REPORT.GROSS SPEC: NORMAL

## 2020-03-12 ENCOUNTER — APPOINTMENT (OUTPATIENT)
Dept: PREADMISSION TESTING | Facility: HOSPITAL | Age: 80
End: 2020-03-12

## 2020-03-12 ENCOUNTER — HOSPITAL ENCOUNTER (OUTPATIENT)
Dept: GENERAL RADIOLOGY | Facility: HOSPITAL | Age: 80
Discharge: HOME | End: 2020-03-12
Admitting: UROLOGY

## 2020-03-12 VITALS
WEIGHT: 169 LBS | HEART RATE: 75 BPM | RESPIRATION RATE: 20 BRPM | HEIGHT: 68 IN | OXYGEN SATURATION: 98 % | DIASTOLIC BLOOD PRESSURE: 86 MMHG | TEMPERATURE: 97 F | SYSTOLIC BLOOD PRESSURE: 178 MMHG | BODY MASS INDEX: 25.61 KG/M2

## 2020-03-12 LAB
ABO GROUP BLD: NORMAL
ANION GAP SERPL CALCULATED.3IONS-SCNC: 12.1 MMOL/L (ref 5–15)
BLD GP AB SCN SERPL QL: NEGATIVE
BUN BLD-MCNC: 10 MG/DL (ref 8–23)
BUN/CREAT SERPL: 8.8 (ref 7–25)
CALCIUM SPEC-SCNC: 9.3 MG/DL (ref 8.6–10.5)
CHLORIDE SERPL-SCNC: 101 MMOL/L (ref 98–107)
CO2 SERPL-SCNC: 27.9 MMOL/L (ref 22–29)
CREAT BLD-MCNC: 1.13 MG/DL (ref 0.76–1.27)
DEPRECATED RDW RBC AUTO: 43.4 FL (ref 37–54)
ERYTHROCYTE [DISTWIDTH] IN BLOOD BY AUTOMATED COUNT: 13.1 % (ref 12.3–15.4)
GFR SERPL CREATININE-BSD FRML MDRD: 63 ML/MIN/1.73
GLUCOSE BLD-MCNC: 111 MG/DL (ref 65–99)
HCT VFR BLD AUTO: 45.1 % (ref 37.5–51)
HGB BLD-MCNC: 15 G/DL (ref 13–17.7)
MCH RBC QN AUTO: 30.1 PG (ref 26.6–33)
MCHC RBC AUTO-ENTMCNC: 33.3 G/DL (ref 31.5–35.7)
MCV RBC AUTO: 90.6 FL (ref 79–97)
PLATELET # BLD AUTO: 211 10*3/MM3 (ref 140–450)
PMV BLD AUTO: 10.6 FL (ref 6–12)
POTASSIUM BLD-SCNC: 4.2 MMOL/L (ref 3.5–5.2)
RBC # BLD AUTO: 4.98 10*6/MM3 (ref 4.14–5.8)
RH BLD: POSITIVE
SODIUM BLD-SCNC: 141 MMOL/L (ref 136–145)
T&S EXPIRATION DATE: NORMAL
WBC NRBC COR # BLD: 4.98 10*3/MM3 (ref 3.4–10.8)

## 2020-03-12 PROCEDURE — 86900 BLOOD TYPING SEROLOGIC ABO: CPT | Performed by: UROLOGY

## 2020-03-12 PROCEDURE — 80048 BASIC METABOLIC PNL TOTAL CA: CPT | Performed by: UROLOGY

## 2020-03-12 PROCEDURE — 71046 X-RAY EXAM CHEST 2 VIEWS: CPT

## 2020-03-12 PROCEDURE — 93005 ELECTROCARDIOGRAM TRACING: CPT

## 2020-03-12 PROCEDURE — 93010 ELECTROCARDIOGRAM REPORT: CPT | Performed by: INTERNAL MEDICINE

## 2020-03-12 PROCEDURE — 85027 COMPLETE CBC AUTOMATED: CPT | Performed by: UROLOGY

## 2020-03-12 PROCEDURE — 36415 COLL VENOUS BLD VENIPUNCTURE: CPT

## 2020-03-12 PROCEDURE — 86901 BLOOD TYPING SEROLOGIC RH(D): CPT | Performed by: UROLOGY

## 2020-03-12 PROCEDURE — 86850 RBC ANTIBODY SCREEN: CPT | Performed by: UROLOGY

## 2020-03-12 NOTE — DISCHARGE INSTRUCTIONS
Take the following medications the morning of surgery with a small sip of water:  BRING BLOOD PRESSURE MEDICINE DAY OF SURGERY    ARRIVAL TIME 1000 TO MAIN OR         General Instructions:  • Do not eat or drink anything after midnight the night before surgery.  • Infants may have breast milk up to four hours before surgery.  • Infants drinking formula may drink formula up to six hours before surgery.   • Patients who avoid smoking, chewing tobacco and alcohol for 4 weeks prior to surgery have a reduced risk of post-operative complications.  Quit smoking as many days before surgery as you can.  • Do not smoke, use chewing tobacco or drink alcohol the day of surgery.   • If applicable bring your C-PAP/ BI-PAP machine.  • Bring any papers given to you in the doctor’s office.  • Wear clean comfortable clothes.  • Do not wear contact lenses, false eyelashes or make-up.  Bring a case for your glasses.   • Bring crutches or walker if applicable.  • Remove all piercings.  Leave jewelry and any other valuables at home.  • Hair extensions with metal clips must be removed prior to surgery.  • The Pre-Admission Testing nurse will instruct you to bring medications if unable to obtain an accurate list in Pre-Admission Testing.        If you were given a blood bank ID arm band remember to bring it with you the day of surgery.    Preventing a Surgical Site Infection:  • For 2 to 3 days before surgery, avoid shaving with a razor because the razor can irritate skin and make it easier to develop an infection.    • Any areas of open skin can increase the risk of a post-operative wound infection by allowing bacteria to enter and travel throughout the body.  Notify your surgeon if you have any skin wounds / rashes even if it is not near the expected surgical site.  The area will need assessed to determine if surgery should be delayed until it is healed.  • The night prior to surgery sleep in a clean bed with clean clothing.  Do not  allow pets to sleep with you.  • Shower on the morning of surgery using a fresh bar of anti-bacterial soap (such as Dial) and clean washcloth.  Dry with a clean towel and dress in clean clothing.  • Ask your surgeon if you will be receiving antibiotics prior to surgery.  • Make sure you, your family, and all healthcare providers clean their hands with soap and water or an alcohol based hand  before caring for you or your wound.    Day of surgery:  Your arrival time is approximately two hours before your scheduled surgery time.  Upon arrival, a Pre-op nurse and Anesthesiologist will review your health history, obtain vital signs, and answer questions you may have.  The only belongings needed at this time will be your home medications and if applicable your C-PAP/BI-PAP machine.  If you are staying overnight your family can leave the rest of your belongings in the car and bring them to your room later.  A Pre-op nurse will start an IV and you may receive medication in preparation for surgery, including something to help you relax.  Your family will be able to see you in the Pre-op area.  Two visitors at a time will be allowed in the Pre-op room.  While you are in surgery your family should notify the waiting room  if they leave the waiting room area and provide a contact phone number.    Please be aware that surgery does come with discomfort.  We want to make every effort to control your discomfort so please discuss any uncontrolled symptoms with your nurse.   Your doctor will most likely have prescribed pain medications.      If you are going home after surgery you will receive individualized written care instructions before being discharged.  A responsible adult must drive you to and from the hospital on the day of your surgery and stay with you for 24 hours.    If you are staying overnight following surgery, you will be transported to your hospital room following the recovery period.  [facility]  [facility] has all private rooms.    If you have any questions please call Pre-Admission Testing at [phone].  Deductibles and co-payments are collected on the day of service. Please be prepared to pay the required co-pay, deductible or deposit on the day of service as defined by your plan.

## (undated) DEVICE — NITINOL WIRE WITH HYDROPHILIC TIP: Brand: SENSOR

## (undated) DEVICE — BLD CUT STR

## (undated) DEVICE — GLV SURG TRIUMPH CLASSIC PF LTX 8 STRL

## (undated) DEVICE — CATHETER,FOLEY,100%SILICONE,16FR,10ML,LF: Brand: MEDLINE

## (undated) DEVICE — URINE LEG BAG WITH EXTENSION TUBE: Brand: DOVER

## (undated) DEVICE — TIDISHIELD UROLOGY DRAIN BAGS FROSTY VINYL STERILE FITS SIEMENS UROSKOP ACCESS 20 PER CASE: Brand: TIDISHIELD